# Patient Record
Sex: FEMALE
[De-identification: names, ages, dates, MRNs, and addresses within clinical notes are randomized per-mention and may not be internally consistent; named-entity substitution may affect disease eponyms.]

---

## 2018-11-07 ENCOUNTER — HOSPITAL ENCOUNTER (EMERGENCY)
Dept: HOSPITAL 14 - H.ER | Age: 73
Discharge: HOME | End: 2018-11-07
Payer: MEDICARE

## 2018-11-07 VITALS
RESPIRATION RATE: 19 BRPM | HEART RATE: 78 BPM | TEMPERATURE: 97 F | DIASTOLIC BLOOD PRESSURE: 78 MMHG | OXYGEN SATURATION: 98 % | SYSTOLIC BLOOD PRESSURE: 128 MMHG

## 2018-11-07 VITALS — BODY MASS INDEX: 29.2 KG/M2

## 2018-11-07 DIAGNOSIS — R05: ICD-10-CM

## 2018-11-07 DIAGNOSIS — J06.9: Primary | ICD-10-CM

## 2018-11-07 LAB
BASE EXCESS BLDV CALC-SCNC: 9.2 MMOL/L (ref 0–2)
BASOPHILS # BLD AUTO: 0.1 K/UL (ref 0–0.2)
BASOPHILS NFR BLD: 0.7 % (ref 0–2)
BUN SERPL-MCNC: 15 MG/DL (ref 7–17)
CALCIUM SERPL-MCNC: 9.5 MG/DL (ref 8.4–10.2)
EOSINOPHIL # BLD AUTO: 0.1 K/UL (ref 0–0.7)
EOSINOPHIL NFR BLD: 1.5 % (ref 0–4)
ERYTHROCYTE [DISTWIDTH] IN BLOOD BY AUTOMATED COUNT: 14.1 % (ref 11.5–14.5)
GFR NON-AFRICAN AMERICAN: > 60
HGB BLD-MCNC: 13.5 G/DL (ref 12–16)
LYMPHOCYTES # BLD AUTO: 1.6 K/UL (ref 1–4.3)
LYMPHOCYTES NFR BLD AUTO: 23.5 % (ref 20–40)
MCH RBC QN AUTO: 29.4 PG (ref 27–31)
MCHC RBC AUTO-ENTMCNC: 32.9 G/DL (ref 33–37)
MCV RBC AUTO: 89.4 FL (ref 81–99)
MONOCYTES # BLD: 0.5 K/UL (ref 0–0.8)
MONOCYTES NFR BLD: 6.8 % (ref 0–10)
NEUTROPHILS # BLD: 4.7 K/UL (ref 1.8–7)
NEUTROPHILS NFR BLD AUTO: 67.5 % (ref 50–75)
NRBC BLD AUTO-RTO: 0.1 % (ref 0–0)
PCO2 BLDV: 55 MMHG (ref 40–60)
PH BLDV: 7.42 [PH] (ref 7.32–7.43)
PLATELET # BLD: 227 K/UL (ref 130–400)
PMV BLD AUTO: 8.7 FL (ref 7.2–11.7)
RBC # BLD AUTO: 4.58 MIL/UL (ref 3.8–5.2)
VENOUS BLOOD FIO2: 21 %
VENOUS BLOOD GAS PO2: 29 MM/HG (ref 30–55)
WBC # BLD AUTO: 6.9 K/UL (ref 4.8–10.8)

## 2018-11-07 PROCEDURE — 99282 EMERGENCY DEPT VISIT SF MDM: CPT

## 2018-11-07 PROCEDURE — 82803 BLOOD GASES ANY COMBINATION: CPT

## 2018-11-07 PROCEDURE — 85025 COMPLETE CBC W/AUTO DIFF WBC: CPT

## 2018-11-07 PROCEDURE — 93005 ELECTROCARDIOGRAM TRACING: CPT

## 2018-11-07 PROCEDURE — 87804 INFLUENZA ASSAY W/OPTIC: CPT

## 2018-11-07 PROCEDURE — 71046 X-RAY EXAM CHEST 2 VIEWS: CPT

## 2018-11-07 PROCEDURE — 80048 BASIC METABOLIC PNL TOTAL CA: CPT

## 2018-11-07 NOTE — ED PDOC
History of Present Illness


History of Present Illness: 


Maryse Burgos is a 73 year old female with a past medical history of 

hypertension, emphysema, CAD, and COPD who is presenting to the ED for 

evaluation of 4 days worsening generalized body aches, and sore throat 

associated with a mild headache. Patient states that she took over the counter M

ucinex with no relief of symptoms. She reports a subjective fever and states 

that she is complaint with her medications and medical follow-ups. Patient 

denies any nausea or vomiting or any other medical complaints.





PMD: Dr. Khalil 











HPI: Influenza


Time Seen by Provider: 11/07/18 10:58


Chief Complaint: Flu-like Symptoms


Chief Complaint (Provider): Flu-like Symptoms


History Per: Patient


Exam Limitations: no limitations


Onset/Duration Of Symptoms: Days


Symptoms include: fever, headache, bodyaches, sore throat.  denies: vomiting





Past Medical History


Reviewed: Historical Data, Nursing Documentation, Vital Signs


Vital Signs: 


                                Last Vital Signs











Temp  98.7 F   11/07/18 09:44


 


Pulse  89   11/07/18 09:44


 


Resp  20   11/07/18 09:44


 


BP  142/77   11/07/18 09:44


 


Pulse Ox  96   11/07/18 09:44














- Medical History


PMH: CAD, COPD, Emphysema, HTN, Hypercholesterolemia, Hypothyroidism, Pneumonia


   Denies: Chronic Kidney Disease





- Surgical History


Surgical History: Coronary Stent


   Denies: CABG





- Family History


Family History: States: Unknown Family Hx





- Social History


Ex-Smoker (has not smoked in the last 12 months): Yes


Alcohol: None


Drugs: Denies





- Immunization History


Hx Tetanus Toxoid Vaccination: No


Hx Influenza Vaccination: Yes (2014)


Hx Pneumococcal Vaccination: Yes (2013)





- Home Medications


Home Medications: 


                                Ambulatory Orders











 Medication  Instructions  Recorded


 


Albuterol 0.083% [Albuterol 0.083% 2.5 mg IH Q4 #100 neb 06/07/15





Inhal Sol (2.5 mg/3 ml) UD]  


 


Albuterol Sulfate [Proair Hfa] 200 puff IH Q4 PRN #1 inh 06/07/15


 


Levothyroxine [Synthroid] 75 mcg PO DAILY 04/02/16


 


Lisinopril [Zestril] 20 mg PO DAILY 04/02/16


 


Simvastatin [Zocor] 20 mg PO DAILY 04/02/16


 


hydroCHLOROthiazide [Hydrodiuril] 25 mg PO DAILY 04/02/16


 


Clopidogrel [Plavix] 75 mg PO DAILY 03/06/17


 


Fluticasone/Vilanterol [Breo 1 each IH DAILY 03/06/17





Ellipta 100-25 Mcg INH]  














- Allergies


Allergies/Adverse Reactions: 


                                    Allergies











Allergy/AdvReac Type Severity Reaction Status Date / Time


 


No Known Allergies Allergy   Verified 04/02/16 14:47














Review of Systems


ROS Statement: Except As Marked, All Systems Reviewed And Found Negative


Constitutional: Positive for: Fever, Other (body aches)


ENT: Positive for: Throat Pain


Gastrointestinal: Negative for: Nausea, Vomiting


Neurological: Positive for: Headache





Physical Exam





- Reviewed


Nursing Documentation Reviewed: Yes


Vital Signs Reviewed: Yes





- Physical Exam


Appears: Positive for: Well, Non-toxic, No Acute Distress


Head Exam: Positive for: ATRAUMATIC, NORMAL INSPECTION, NORMOCEPHALIC


Skin: Positive for: Normal Color, Warm, DRY


Eye Exam: Positive for: EOMI, Normal appearance, PERRL


ENT: Positive for: Pharynx Is (mildly erythematous), Other (tenderness to 

palpation of cervical lymph nodes)


Neck: Positive for: Normal, Painless ROM, Supple


Cardiovascular/Chest: Positive for: Regular Rate, Rhythm.  Negative for: Murmur


Respiratory: Positive for: Normal Breath Sounds, Other (decreased air entry ).  

Negative for: Crackles, Rales, Rhonchi, Respiratory Distress


Gastrointestinal/Abdominal: Positive for: Normal Exam, Soft.  Negative for: 

Tenderness


Back: Positive for: Normal Inspection.  Negative for: L CVA Tenderness, R CVA 

Tenderness, Vertebral Tenderness


Extremity: Positive for: Normal ROM.  Negative for: Deformity, Swelling


Neurologic/Psych: Positive for: Alert, Oriented.  Negative for: Motor/Sensory 

Deficits





Medical Decision Making


Medical Decision Making: 


Time: 11:03


A/P: workup for influenza v other infectious etiology


--Labs, EKG, CXR, IV fluids, Tylenol


--Reassess Patient 





----------------

--------------------------------------------------------------------------------


-


Scribe Attestation:   


Documented by Sully Rabago, acting as a scribe for Mindi Pulido MD.





Provider Scribe Attestation:


All medical record entries made by the Scribe were at my direction and 

personally dictated by me. I have reviewed the chart and agree that the record 

accurately reflects my personal performance of the history, physical exam, 

medical decision making, and the department course for this patient. I have also

personally directed, reviewed, and agree with the discharge instructions and 

disposition.











1:15pm


Pt with improved symptoms with Tylenol.  Influenza negative and labs WNL.  CXR 

unremarkable.  Pt advised to take Tylenol for pain and will follow up with 

primary medical doctor in one week.  REturn parameters discussed.





- Laboratory Results


Result Diagrams: 


                                 11/07/18 11:48





                                 11/07/18 11:48





- ECG


O2 Sat by Pulse Oximetry: 96





Disposition





- Clinical Impression


Clinical Impression: 


 Viral URI with cough








- Disposition


Disposition: Routine/Home


Disposition Time: 13:18


Condition: IMPROVED


Forms:  [facility] (English)

## 2018-11-07 NOTE — CARD
--------------- APPROVED REPORT --------------





Date of service: 11/07/2018



EKG Measurement

Heart [id number]

UT [id number]

[id number]

[id number]

[id number]



<Conclusion>

Sinus rhythm with occasional atrial-paced complexes. There are 

abnormal pacemaker spikes. Correlate clinically.

Left axis deviation

Left bundle branch block

Abnormal ECG

## 2018-11-07 NOTE — RAD
Date of service: 



11/07/2018



HISTORY:

Cough and flu like illness 



COMPARISON:

Comparison made with chest radiograph dated 03/06/2016..



TECHNIQUE:

Chest PA and lateral



FINDINGS:



LUNGS:

Slightly increased and coarsened interstitial markings; findings may 

represent sequela of a viral illness or possibly 

reactive/inflammatory airway disease. 



PLEURA:

No significant pleural effusion identified. No pneumothorax apparent.



CARDIOVASCULAR:

Mild aortic atherosclerotic calcification present.



Normal cardiac size. No pulmonary vascular congestion. 



OSSEOUS STRUCTURES:

Chronic appearing anterior stature loss of multiple upper/midthoracic 

segments.  Multilevel degenerative spondylosis.



VISUALIZED UPPER ABDOMEN:

Normal.



OTHER FINDINGS:

None.



IMPRESSION:

Slightly increased and coarsened interstitial markings; findings may 

represent sequela of a viral illness or possibly 

reactive/inflammatory airway disease.

## 2018-12-09 ENCOUNTER — HOSPITAL ENCOUNTER (EMERGENCY)
Dept: HOSPITAL 14 - H.ER | Age: 73
Discharge: HOME | End: 2018-12-09
Payer: MEDICARE

## 2018-12-09 VITALS
TEMPERATURE: 98.1 F | OXYGEN SATURATION: 95 % | RESPIRATION RATE: 16 BRPM | SYSTOLIC BLOOD PRESSURE: 146 MMHG | HEART RATE: 79 BPM | DIASTOLIC BLOOD PRESSURE: 84 MMHG

## 2018-12-09 VITALS — BODY MASS INDEX: 27.6 KG/M2

## 2018-12-09 DIAGNOSIS — J06.9: Primary | ICD-10-CM

## 2018-12-09 DIAGNOSIS — R05: ICD-10-CM

## 2018-12-09 LAB
ALBUMIN SERPL-MCNC: 3.8 G/DL (ref 3.5–5)
ALBUMIN/GLOB SERPL: 1 {RATIO} (ref 1–2.1)
ALT SERPL-CCNC: 19 U/L (ref 9–52)
AST SERPL-CCNC: 19 U/L (ref 14–36)
BACTERIA #/AREA URNS HPF: (no result) /[HPF]
BASOPHILS # BLD AUTO: 0 K/UL (ref 0–0.2)
BASOPHILS NFR BLD: 0.4 % (ref 0–2)
BILIRUB UR-MCNC: NEGATIVE MG/DL
BUN SERPL-MCNC: 18 MG/DL (ref 7–17)
CALCIUM SERPL-MCNC: 9.3 MG/DL (ref 8.4–10.2)
COLOR UR: YELLOW
EOSINOPHIL # BLD AUTO: 0.1 K/UL (ref 0–0.7)
EOSINOPHIL NFR BLD: 1.6 % (ref 0–4)
ERYTHROCYTE [DISTWIDTH] IN BLOOD BY AUTOMATED COUNT: 14.3 % (ref 11.5–14.5)
GFR NON-AFRICAN AMERICAN: > 60
GLUCOSE UR STRIP-MCNC: (no result) MG/DL
HGB BLD-MCNC: 13.3 G/DL (ref 12–16)
LEUKOCYTE ESTERASE UR-ACNC: (no result) LEU/UL
LYMPHOCYTES # BLD AUTO: 1.5 K/UL (ref 1–4.3)
LYMPHOCYTES NFR BLD AUTO: 20.9 % (ref 20–40)
MCH RBC QN AUTO: 29.5 PG (ref 27–31)
MCHC RBC AUTO-ENTMCNC: 33.3 G/DL (ref 33–37)
MCV RBC AUTO: 88.4 FL (ref 81–99)
MONOCYTES # BLD: 0.4 K/UL (ref 0–0.8)
MONOCYTES NFR BLD: 6 % (ref 0–10)
NEUTROPHILS # BLD: 5.2 K/UL (ref 1.8–7)
NEUTROPHILS NFR BLD AUTO: 71.1 % (ref 50–75)
NRBC BLD AUTO-RTO: 0.2 % (ref 0–0)
PH UR STRIP: 6 [PH] (ref 5–8)
PLATELET # BLD: 229 K/UL (ref 130–400)
PMV BLD AUTO: 8.8 FL (ref 7.2–11.7)
PROT UR STRIP-MCNC: 30 MG/DL
RBC # BLD AUTO: 4.5 MIL/UL (ref 3.8–5.2)
RBC # UR STRIP: (no result) /UL
SP GR UR STRIP: 1.03 (ref 1–1.03)
SQUAMOUS EPITHIAL: 10 /HPF (ref 0–5)
URINE CLARITY: (no result)
UROBILINOGEN UR-MCNC: 2 MG/DL (ref 0.2–1)
WBC # BLD AUTO: 7.3 K/UL (ref 4.8–10.8)

## 2018-12-09 PROCEDURE — 96360 HYDRATION IV INFUSION INIT: CPT

## 2018-12-09 PROCEDURE — 94640 AIRWAY INHALATION TREATMENT: CPT

## 2018-12-09 PROCEDURE — 71046 X-RAY EXAM CHEST 2 VIEWS: CPT

## 2018-12-09 PROCEDURE — 99284 EMERGENCY DEPT VISIT MOD MDM: CPT

## 2018-12-09 PROCEDURE — 81003 URINALYSIS AUTO W/O SCOPE: CPT

## 2018-12-09 PROCEDURE — 85025 COMPLETE CBC W/AUTO DIFF WBC: CPT

## 2018-12-09 PROCEDURE — 80053 COMPREHEN METABOLIC PANEL: CPT

## 2018-12-09 PROCEDURE — 87804 INFLUENZA ASSAY W/OPTIC: CPT

## 2018-12-09 NOTE — RAD
Date of service: 



12/09/2018



HISTORY:

 r/p PNA 



COMPARISON:

11/07/2018



TECHNIQUE:

Chest PA and lateral



FINDINGS:



LUNGS:

No active pulmonary disease.



PLEURA:

No significant pleural effusion identified. No pneumothorax apparent.



CARDIOVASCULAR:

No aortic atherosclerotic calcification present.



Normal cardiac size. No pulmonary vascular congestion. 



OSSEOUS STRUCTURES:

No significant abnormalities.



VISUALIZED UPPER ABDOMEN:

Normal.



OTHER FINDINGS:

None.



IMPRESSION:

No active disease.

## 2018-12-09 NOTE — ED PDOC
History of Present Illness


History of Present Illness: 


Pt presents to the ED complaining of flu like symptoms that include a fever 

(resolved on presentation), cough and feeling overall body pain; Pt has 

complained of these symptoms for the last week and has a history of a cardiac 

stent (2014), COPD but denies diabetes or any other chronic medical conditions. 

Pt indicates that she has received the flu shot as well as her PNA vaccine this 

year. 





HPI: Influenza


Time Seen by Provider: 12/09/18 09:54


Chief Complaint: Flu-like Symptoms


Chief Complaint (Provider): Flu Like Symptoms


History Per: Patient


Exam Limitations: no limitations


Have you had recent travel within the past 21 days to any of: No


Onset/Duration Of Symptoms: Days (seven)


Symptoms include: fever, bodyaches, cough, nasal congestion, vomiting


Sick Contacts (Context): None


Hx Influenza Vaccination: Yes


Risk factors for flu complications: Yes: adult > 65 years, chronic lung disease,

endocrine disorders, heart disease





Past Medical History


Reviewed: Historical Data, Nursing Documentation


Vital Signs: 


                                Last Vital Signs











Temp  98.4 F   12/09/18 09:09


 


Pulse  91 H  12/09/18 09:09


 


Resp  20   12/09/18 09:09


 


BP  157/90 H  12/09/18 09:09


 


Pulse Ox  97   12/09/18 09:09














- Medical History


PMH: CAD, COPD, Emphysema, HTN, Hypercholesterolemia, Hypothyroidism, Pneumonia


   Denies: Chronic Kidney Disease





- Surgical History


Surgical History: Coronary Stent


   Denies: CABG





- Family History


Family History: States: Unknown Family Hx





- Living Arrangements


Living Arrangements: Alone





- Immunization History


Hx Tetanus Toxoid Vaccination: No


Hx Influenza Vaccination: Yes


Hx Pneumococcal Vaccination: Yes





- Home Medications


Home Medications: 


                                Ambulatory Orders











 Medication  Instructions  Recorded


 


Albuterol 0.083% [Albuterol 0.083% 2.5 mg IH Q4 #100 neb 06/07/15





Inhal Sol (2.5 mg/3 ml) UD]  


 


Albuterol Sulfate [Proair Hfa] 200 puff IH Q4 PRN #1 inh 06/07/15


 


Levothyroxine [Synthroid] 75 mcg PO DAILY 04/02/16


 


Lisinopril [Zestril] 20 mg PO DAILY 04/02/16


 


Simvastatin [Zocor] 20 mg PO DAILY 04/02/16


 


hydroCHLOROthiazide [Hydrodiuril] 25 mg PO DAILY 04/02/16


 


Clopidogrel [Plavix] 75 mg PO DAILY 03/06/17


 


Fluticasone/Vilanterol [Breo 1 each IH DAILY 03/06/17





Ellipta 100-25 Mcg INH]  


 


Acetaminophen [Tylenol 325mg tab] 650 mg PO Q6 #100 tab 11/07/18


 


Benzonatate 200 mg PO TID #30 capsule 12/09/18














- Allergies


Allergies/Adverse Reactions: 


                                    Allergies











Allergy/AdvReac Type Severity Reaction Status Date / Time


 


No Known Allergies Allergy   Verified 12/09/18 09:15














Review of Systems


ROS Statement: Except As Marked, All Systems Reviewed And Found Negative


Constitutional: Positive for: Fever, Chills


Respiratory: Positive for: Cough


Gastrointestinal: Positive for: Nausea





Medical Decision Making


Medical Decision Making: 





Fluids


CXR


CBC/CMP


Flu Swab





all negative results


pt appears well after IV hydration therapy and requesting discharge





- Laboratory Results


Result Diagrams: 


                                 12/09/18 10:10





                                 12/09/18 10:10





- ECG


O2 Sat by Pulse Oximetry: 97





Disposition





- Clinical Impression


Clinical Impression: 


 Upper respiratory infection, Viral URI with cough








- Patient ED Disposition


Is Patient to be Admitted: No


Doctor Will See Patient In The: Office


Counseled Patient/Family Regarding: Studies Performed, Diagnosis, Need For 

Followup





- Disposition


Referrals: 


Gurpreet Tamayo MD [Family Provider] - 


Disposition: Routine/Home


Disposition Time: 12:58


Condition: STABLE


Prescriptions: 


Benzonatate 200 mg PO TID #30 capsule


Instructions:  Viral Upper Respiratory Infection, Adult (DC)


Forms:  [facility] (English)